# Patient Record
Sex: MALE | Race: WHITE | NOT HISPANIC OR LATINO | URBAN - METROPOLITAN AREA
[De-identification: names, ages, dates, MRNs, and addresses within clinical notes are randomized per-mention and may not be internally consistent; named-entity substitution may affect disease eponyms.]

---

## 2020-10-29 ENCOUNTER — EMERGENCY (EMERGENCY)
Facility: HOSPITAL | Age: 35
LOS: 1 days | End: 2020-10-29
Attending: EMERGENCY MEDICINE
Payer: MEDICAID

## 2020-10-29 VITALS
HEART RATE: 97 BPM | RESPIRATION RATE: 20 BRPM | TEMPERATURE: 98 F | DIASTOLIC BLOOD PRESSURE: 89 MMHG | OXYGEN SATURATION: 94 % | SYSTOLIC BLOOD PRESSURE: 147 MMHG

## 2020-10-29 VITALS
OXYGEN SATURATION: 98 % | HEART RATE: 100 BPM | WEIGHT: 315 LBS | TEMPERATURE: 99 F | RESPIRATION RATE: 20 BRPM | HEIGHT: 74 IN | SYSTOLIC BLOOD PRESSURE: 162 MMHG | DIASTOLIC BLOOD PRESSURE: 100 MMHG

## 2020-10-29 PROCEDURE — 99283 EMERGENCY DEPT VISIT LOW MDM: CPT

## 2020-10-29 PROCEDURE — 99281 EMR DPT VST MAYX REQ PHY/QHP: CPT

## 2020-10-29 RX ORDER — DEXTROAMPHETAMINE SACCHARATE, AMPHETAMINE ASPARTATE, DEXTROAMPHETAMINE SULFATE AND AMPHETAMINE SULFATE 1.875; 1.875; 1.875; 1.875 MG/1; MG/1; MG/1; MG/1
1 TABLET ORAL
Qty: 9 | Refills: 0
Start: 2020-10-29 | End: 2020-10-31

## 2020-10-29 RX ORDER — HYDRALAZINE HCL 50 MG
1 TABLET ORAL
Qty: 9 | Refills: 0
Start: 2020-10-29 | End: 2020-10-31

## 2020-10-29 RX ORDER — AMLODIPINE BESYLATE 2.5 MG/1
5 TABLET ORAL ONCE
Refills: 0 | Status: COMPLETED | OUTPATIENT
Start: 2020-10-29 | End: 2020-10-29

## 2020-10-29 RX ORDER — AMOXICILLIN 250 MG/5ML
250 SUSPENSION, RECONSTITUTED, ORAL (ML) ORAL ONCE
Refills: 0 | Status: COMPLETED | OUTPATIENT
Start: 2020-10-29 | End: 2020-10-29

## 2020-10-29 RX ORDER — CARVEDILOL PHOSPHATE 80 MG/1
6.25 CAPSULE, EXTENDED RELEASE ORAL ONCE
Refills: 0 | Status: COMPLETED | OUTPATIENT
Start: 2020-10-29 | End: 2020-10-29

## 2020-10-29 RX ORDER — ALPRAZOLAM 0.25 MG
1 TABLET ORAL
Qty: 9 | Refills: 0
Start: 2020-10-29 | End: 2020-10-31

## 2020-10-29 RX ORDER — DIPHENHYDRAMINE HCL 50 MG
25 CAPSULE ORAL DAILY
Refills: 0 | Status: DISCONTINUED | OUTPATIENT
Start: 2020-10-29 | End: 2020-11-03

## 2020-10-29 RX ORDER — ALPRAZOLAM 0.25 MG
2 TABLET ORAL ONCE
Refills: 0 | Status: DISCONTINUED | OUTPATIENT
Start: 2020-10-29 | End: 2020-10-29

## 2020-10-29 RX ORDER — AMLODIPINE BESYLATE 2.5 MG/1
1 TABLET ORAL
Qty: 6 | Refills: 0
Start: 2020-10-29 | End: 2020-10-31

## 2020-10-29 RX ORDER — DEXTROAMPHETAMINE SACCHARATE, AMPHETAMINE ASPARTATE, DEXTROAMPHETAMINE SULFATE AND AMPHETAMINE SULFATE 1.875; 1.875; 1.875; 1.875 MG/1; MG/1; MG/1; MG/1
30 TABLET ORAL ONCE
Refills: 0 | Status: DISCONTINUED | OUTPATIENT
Start: 2020-10-29 | End: 2020-10-29

## 2020-10-29 RX ORDER — HYDRALAZINE HCL 50 MG
100 TABLET ORAL ONCE
Refills: 0 | Status: COMPLETED | OUTPATIENT
Start: 2020-10-29 | End: 2020-10-29

## 2020-10-29 RX ORDER — DIPHENHYDRAMINE HCL 50 MG
1 CAPSULE ORAL
Qty: 9 | Refills: 0
Start: 2020-10-29 | End: 2020-10-31

## 2020-10-29 RX ORDER — AMOXICILLIN 250 MG/5ML
1 SUSPENSION, RECONSTITUTED, ORAL (ML) ORAL
Qty: 9 | Refills: 0
Start: 2020-10-29 | End: 2020-10-31

## 2020-10-29 RX ORDER — CARVEDILOL PHOSPHATE 80 MG/1
1 CAPSULE, EXTENDED RELEASE ORAL
Qty: 6 | Refills: 0
Start: 2020-10-29 | End: 2020-10-31

## 2020-10-29 RX ADMIN — Medication 100 MILLIGRAM(S): at 15:47

## 2020-10-29 RX ADMIN — Medication 2 MILLIGRAM(S): at 15:48

## 2020-10-29 RX ADMIN — DEXTROAMPHETAMINE SACCHARATE, AMPHETAMINE ASPARTATE, DEXTROAMPHETAMINE SULFATE AND AMPHETAMINE SULFATE 30 MILLIGRAM(S): 1.875; 1.875; 1.875; 1.875 TABLET ORAL at 15:49

## 2020-10-29 RX ADMIN — Medication 250 MILLIGRAM(S): at 15:47

## 2020-10-29 RX ADMIN — Medication 20 MILLIGRAM(S): at 15:48

## 2020-10-29 RX ADMIN — AMLODIPINE BESYLATE 5 MILLIGRAM(S): 2.5 TABLET ORAL at 15:48

## 2020-10-29 RX ADMIN — CARVEDILOL PHOSPHATE 6.25 MILLIGRAM(S): 80 CAPSULE, EXTENDED RELEASE ORAL at 15:48

## 2020-10-29 RX ADMIN — Medication 25 MILLIGRAM(S): at 15:46

## 2020-10-29 NOTE — ED PROVIDER NOTE - NSFOLLOWUPINSTRUCTIONS_ED_ALL_ED_FT
medication verified from [facility] ([phone])     - alprazolam 2 mg 4 times a day as needed for 1 month (sent 10/6, picked up this morning).   - amphetamine 30 mg 3 times as needed.   - enalapril 20 mg BID,  - hydralazine 100 mg TID.   - amilopidine 5 mg BID.   - Carvedilol 6.25 mg BID.   - amoxicillin 250 mg TID x 10 days.

## 2020-10-29 NOTE — ED PROVIDER NOTE - NS ED ROS FT
Review of Systems  •	CONSTITUTIONAL - no  fever, no diaphoresis, no weight change  •	SKIN - no rash  •	HEMATOLOGIC - no bleeding, no bruising  •	EYES - no eye pain, no blurred vision  •	ENT - no change in hearing, no pain  •	RESPIRATORY - no shortness of breath, no cough  •	CARDIAC - no chest pain, no palpitations  •	GI - no abd pain, no nausea, no vomiting, no diarrhea, no constipation, no bleeding  •	GENITO-URINARY - no discharge, no dysuria; no hematuria,   •	ENDO - no polydipsia, no polyuria, no heat/no cold intolerance  •	MUSCULOSKELETAL - no joint pain, no swelling, no redness  •	NEUROLOGIC - no weakness, no headache, no anesthesia, no paresthesias  •	PSYCH - no anxiety, non suicidal, non homicidal, no hallucination, no depression

## 2020-10-29 NOTE — ED ADULT NURSE REASSESSMENT NOTE - NS ED NURSE REASSESS COMMENT FT1
pt here [facility] in [facility] office custody. Pt stating he needs his medication and unable to go to correction until he has them. Pt medicated as per MD orders. Awaiting payment for prescription meds then patient can be D/C'd. NAD noted, respirations even & unlabored. Safety maintained. Will continue to monitor.

## 2020-10-29 NOTE — ED PROVIDER NOTE - PROGRESS NOTE DETAILS
I spoke to pharmacy and verified his meds. will give him a dose here. will sent a few days of meds to prevent withdrawal. ISTOP checked. patient gets benzo long term from single provider. 10/23/2020 10/26/2020 dextroamp-amphetamin 30 mg tab  12 4 Dicanio, [employer] [facility]   10/23/2020 10/26/2020 alprazolam 2 mg tablet  16 4 Dicanio, [employer] [facility]   09/29/2020 09/29/2020 dextroamp-amphetamin 30 mg tab  90 30 Dicanio, [employer] [facility]   09/29/2020 09/29/2020 alprazolam 2 mg tablet  120 30 Dicanio, [employer] [facility] I spoke to [facility] who called [facility]. patient just picked up his xanax today and not due for more ampehtamine. I explained that patient unable to receive meds while in group home. patient will have to pay out of pocket. wife is speaking with [facility] to purchase the meds.

## 2020-10-29 NOTE — ED ADULT NURSE REASSESSMENT NOTE - NS ED NURSE REASSESS COMMENT FT1
pt D/C'd to [facility] office, medications signed for and given to . ANM made aware of situation. Safety maintained

## 2020-10-29 NOTE — ED ADULT TRIAGE NOTE - CHIEF COMPLAINT QUOTE
pt [place] ambulance [place] court with c/o feelings like he will have a seizure, states he usually takes Xanax 2mg tab 3-4 times a day and his BP medication, states he hasn't taken it since yesterday.

## 2020-10-29 NOTE — ED PROVIDER NOTE - OBJECTIVE STATEMENT
34 yo M hx of anxiety and HTN under police custody. Patient report that he is on xanax 2 mg TID or QID. last dose was yesterday afternoon. He was at [facility] and report feeling like he was going through withdrawl. Patient also report that he hasn't taken his BP meds since he was in [facility]. Per police, they were unable to give him any medication unless it was verified and with his name on     I spoke Lakshmi from [facility]. alprazolam 2 mg 4 times a day as needed for 1 month (sent 10/6, picked up this morning). amphetamine 30 mg 3 times as needed. enalapril 20 mg BID, hydralazine 100 mg TID. amilopidine 5 mg BID. Carvedilol 6.25 mg BID. amoxicillin 250 mg TID x 10 days. 34 yo M hx of anxiety and HTN under police custody. Patient report that he is on xanax 2 mg TID or QID. last dose was yesterday afternoon. He was at [facility] and report feeling like he was going through withdrawl. Patient also report that he hasn't taken his BP meds since he was in [facility]. Per police, they were unable to give him any medication unless it was verified and with his name on     I spoke Lakshmi from [facility] and verified patient's home meds: alprazolam 2 mg 4 times a day as needed for 1 month (sent 10/6, picked up this morning). amphetamine 30 mg 3 times as needed. enalapril 20 mg BID, hydralazine 100 mg TID. amilopidine 5 mg BID. Carvedilol 6.25 mg BID. amoxicillin 250 mg TID x 10 days.

## 2020-10-29 NOTE — ED PROVIDER NOTE - PATIENT PORTAL LINK FT
You can access the FollowMyHealth Patient Portal offered by [facility] by registering at the following website: [email or web address]. By joining [facility]’s FollowMyHealth portal, you will also be able to view your health information using other applications (apps) compatible with our system.

## 2020-10-29 NOTE — ED PROVIDER NOTE - CLINICAL SUMMARY MEDICAL DECISION MAKING FREE TEXT BOX
34 yo M sent in from correction for missing his medication. Per police, they are unable to administer his meds unless he brought it with him and verified. will give him a dose of his meds here and sent to [facility]. 36 yo M sent in from residential for missing his medication. Per police, they are unable to administer his meds unless he brought it with him and verified. will give him a dose of his meds here and sent to [facility]. meds picked up from pharmacy to bring to residential.

## 2022-01-18 PROBLEM — Z00.00 ENCOUNTER FOR PREVENTIVE HEALTH EXAMINATION: Status: ACTIVE | Noted: 2022-01-18

## 2022-01-21 ENCOUNTER — APPOINTMENT (OUTPATIENT)
Dept: NEUROLOGY | Facility: CLINIC | Age: 37
End: 2022-01-21